# Patient Record
Sex: FEMALE | URBAN - METROPOLITAN AREA
[De-identification: names, ages, dates, MRNs, and addresses within clinical notes are randomized per-mention and may not be internally consistent; named-entity substitution may affect disease eponyms.]

---

## 2024-06-25 ENCOUNTER — APPOINTMENT (RX ONLY)
Dept: URBAN - METROPOLITAN AREA CLINIC 173 | Facility: CLINIC | Age: 34
Setting detail: DERMATOLOGY
End: 2024-06-25

## 2024-06-25 DIAGNOSIS — R04.0 EPISTAXIS: ICD-10-CM

## 2024-06-25 DIAGNOSIS — Z41.9 ENCOUNTER FOR PROCEDURE FOR PURPOSES OTHER THAN REMEDYING HEALTH STATE, UNSPECIFIED: ICD-10-CM

## 2024-06-25 PROCEDURE — ? CAUTERY

## 2024-06-25 PROCEDURE — ? COSMETIC CONSULTATION: GENERAL

## 2024-06-25 ASSESSMENT — LOCATION DETAILED DESCRIPTION DERM: LOCATION DETAILED: NASAL TIP

## 2024-06-25 ASSESSMENT — LOCATION ZONE DERM: LOCATION ZONE: NOSE

## 2024-06-25 ASSESSMENT — LOCATION SIMPLE DESCRIPTION DERM: LOCATION SIMPLE: NOSE

## 2024-06-25 NOTE — PROCEDURE: CAUTERY
Estimated Blood Loss (Optional): minimal
Anesthesia Type: 1% lidocaine with epinephrine
Anesthesia Volume In Cc: 0
Detail Level: Detailed

## 2024-06-25 NOTE — HPI: OTHER
Condition:: Chronic epistaxis
Please Describe Your Condition:: Patient presents to Dr.DeVictor from a referral from an outside physician, patient does not recall which one. Patient reports heavy nosebleeds since childhood which worsened when the patient moved to [place] from [facility]. Patient states that triggers are heat, hypertension, stress, and airplane travel. Patient reports that she currently has had a nose bleed everyday that last 30 minutes to an hour. Patient denies seeing an ENT for symptoms. States her primary physician has not treated the symptoms. Cosmetic history includes Dysport in the forehead and temples, Restylane in lips and chin over 6 months ago, and kybella in the chin. Denies trouble breathing, or syncopal episodes. Reports snoring, hgb minimally lowered, weakness, and history of keloid scarring. Patient is currently living in [place] because she is the guardian of two younger sisters attending [facility] [employer].

## 2024-08-30 ENCOUNTER — APPOINTMENT (RX ONLY)
Dept: URBAN - METROPOLITAN AREA CLINIC 173 | Facility: CLINIC | Age: 34
Setting detail: DERMATOLOGY
End: 2024-08-30

## 2024-08-30 DIAGNOSIS — R04.0 EPISTAXIS: ICD-10-CM

## 2024-08-30 PROCEDURE — ? CAUTERY

## 2024-08-30 ASSESSMENT — LOCATION SIMPLE DESCRIPTION DERM: LOCATION SIMPLE: NOSE

## 2024-08-30 ASSESSMENT — LOCATION DETAILED DESCRIPTION DERM: LOCATION DETAILED: NASAL TIP

## 2024-08-30 ASSESSMENT — LOCATION ZONE DERM: LOCATION ZONE: NOSE

## 2024-08-30 NOTE — HPI: OTHER
Condition:: Chronic epistaxis
Please Describe Your Condition:: Patient presents to Dr.DeVictor from a referral from an outside physician, patient does not recall which one. Patient reports heavy nosebleeds since childhood which worsened when the patient moved to [place] from [facility]. Patient states that triggers are heat, hypertension, stress, and airplane travel. Patient reports that she currently has had a nose bleed everyday that last 30 minutes to an hour. Patient denies seeing an ENT for symptoms. States her primary physician has not treated the symptoms. Cosmetic history includes Dysport in the forehead and temples, Restylane in lips and chin over 6 months ago, and kybella in the chin. Denies trouble breathing, or syncopal episodes. Reports snoring, hgb minimally lowered, weakness, and history of keloid scarring. Patient is currently living in [place] because she is the guardian of two younger sisters attending [facility] [employer].

## 2024-08-30 NOTE — PROCEDURE: CAUTERY
Estimated Blood Loss (Optional): minimal
Anesthesia Type: 1% lidocaine with epinephrine
Anesthesia Volume In Cc: 3
Post Care: Patient directed to apply Aquaphor inside of nostril three times daily and to keep a log of symptoms. Patient will return for follow up in 2 weeks. If nose bleed occurs, hold pressure by pinching bridge of nose.
Detail Level: Detailed

## 2024-09-14 ENCOUNTER — APPOINTMENT (RX ONLY)
Dept: URBAN - METROPOLITAN AREA CLINIC 173 | Facility: CLINIC | Age: 34
Setting detail: DERMATOLOGY
End: 2024-09-14

## 2024-09-14 DIAGNOSIS — Z41.9 ENCOUNTER FOR PROCEDURE FOR PURPOSES OTHER THAN REMEDYING HEALTH STATE, UNSPECIFIED: ICD-10-CM

## 2024-09-14 PROCEDURE — ? POST-OP WOUND CHECK

## 2024-09-14 ASSESSMENT — LOCATION ZONE DERM: LOCATION ZONE: NOSE

## 2024-09-14 ASSESSMENT — LOCATION SIMPLE DESCRIPTION DERM: LOCATION SIMPLE: NOSE

## 2024-09-14 ASSESSMENT — LOCATION DETAILED DESCRIPTION DERM: LOCATION DETAILED: NASAL TIP

## 2024-09-14 NOTE — PROCEDURE: POST-OP WOUND CHECK
Detail Level: Detailed
Add 1585x Cpt? (Do Not Bill If You Billed For The Procedure Placing The Sutures. This Is An Add-On Code That Must Be Billed With An E/M Visit Code): No
Quality 355: Unplanned Reoperation Within The 30 Day Postoperative Period: No return to the operating room for a surgical procedure, for complications of the principal operative procedure, within 30 days of the principal operative procedure
Quality 357: Surgical Site Infection (Ssi): No surgical site infection

## 2024-09-17 ENCOUNTER — APPOINTMENT (RX ONLY)
Dept: URBAN - METROPOLITAN AREA CLINIC 173 | Facility: CLINIC | Age: 34
Setting detail: DERMATOLOGY
End: 2024-09-17

## 2024-09-17 DIAGNOSIS — Z41.9 ENCOUNTER FOR PROCEDURE FOR PURPOSES OTHER THAN REMEDYING HEALTH STATE, UNSPECIFIED: ICD-10-CM

## 2024-09-17 PROCEDURE — ? POST-OP WOUND CHECK

## 2024-09-17 ASSESSMENT — LOCATION SIMPLE DESCRIPTION DERM: LOCATION SIMPLE: NOSE

## 2024-09-17 ASSESSMENT — LOCATION ZONE DERM: LOCATION ZONE: NOSE

## 2024-09-17 ASSESSMENT — LOCATION DETAILED DESCRIPTION DERM: LOCATION DETAILED: NASAL TIP

## 2024-09-17 NOTE — PROCEDURE: POST-OP WOUND CHECK
Detail Level: Detailed
Add 1585x Cpt? (Do Not Bill If You Billed For The Procedure Placing The Sutures. This Is An Add-On Code That Must Be Billed With An E/M Visit Code): No
Quality 355: Unplanned Reoperation Within The 30 Day Postoperative Period: No return to the operating room for a surgical procedure, for complications of the principal operative procedure, within 30 days of the principal operative procedure
Quality 357: Surgical Site Infection (Ssi): No surgical site infection
Additional Comments: Pt denies any nose bleeds since electrocautery. States her nose is tender and after applying aquaphor to the inside of her nostrils, the Q-tip has crusted blood. Upon assessments, nostrils have scabs but no visible blood vessels. Patient directed to continue aquaphor and return in 2 months for follow up evaluation.

## 2024-11-05 ENCOUNTER — APPOINTMENT (RX ONLY)
Dept: URBAN - METROPOLITAN AREA CLINIC 173 | Facility: CLINIC | Age: 34
Setting detail: DERMATOLOGY
End: 2024-11-05

## 2024-11-05 DIAGNOSIS — Z41.9 ENCOUNTER FOR PROCEDURE FOR PURPOSES OTHER THAN REMEDYING HEALTH STATE, UNSPECIFIED: ICD-10-CM

## 2024-11-05 PROCEDURE — ? POST-OP WOUND CHECK

## 2024-11-05 ASSESSMENT — LOCATION ZONE DERM: LOCATION ZONE: NOSE

## 2024-11-05 ASSESSMENT — LOCATION SIMPLE DESCRIPTION DERM: LOCATION SIMPLE: NOSE

## 2024-11-05 ASSESSMENT — LOCATION DETAILED DESCRIPTION DERM: LOCATION DETAILED: NASAL TIP
